# Patient Record
Sex: MALE | Race: BLACK OR AFRICAN AMERICAN | NOT HISPANIC OR LATINO | Employment: FULL TIME | URBAN - METROPOLITAN AREA
[De-identification: names, ages, dates, MRNs, and addresses within clinical notes are randomized per-mention and may not be internally consistent; named-entity substitution may affect disease eponyms.]

---

## 2018-11-19 ENCOUNTER — OFFICE VISIT (OUTPATIENT)
Dept: INTERNAL MEDICINE | Facility: CLINIC | Age: 33
End: 2018-11-19
Payer: COMMERCIAL

## 2018-11-19 ENCOUNTER — LAB VISIT (OUTPATIENT)
Dept: LAB | Facility: HOSPITAL | Age: 33
End: 2018-11-19
Attending: FAMILY MEDICINE
Payer: COMMERCIAL

## 2018-11-19 VITALS
SYSTOLIC BLOOD PRESSURE: 146 MMHG | OXYGEN SATURATION: 98 % | WEIGHT: 277.31 LBS | HEIGHT: 71 IN | HEART RATE: 78 BPM | DIASTOLIC BLOOD PRESSURE: 104 MMHG | TEMPERATURE: 99 F | BODY MASS INDEX: 38.82 KG/M2

## 2018-11-19 DIAGNOSIS — Z11.4 SCREENING FOR HIV WITHOUT PRESENCE OF RISK FACTORS: ICD-10-CM

## 2018-11-19 DIAGNOSIS — Z11.3 ROUTINE SCREENING FOR STI (SEXUALLY TRANSMITTED INFECTION): ICD-10-CM
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
## 2018-11-19 DIAGNOSIS — M54.2 NECK PAIN, ACUTE: ICD-10-CM

## 2018-11-19 DIAGNOSIS — R74.01 ELEVATED TRANSAMINASE LEVEL: Chronic | ICD-10-CM

## 2018-11-19 DIAGNOSIS — Z11.59 ENCOUNTER FOR HEPATITIS C SCREENING TEST FOR LOW RISK PATIENT: ICD-10-CM

## 2018-11-19 DIAGNOSIS — S13.9XXA ACUTE NECK SPRAIN, INITIAL ENCOUNTER: ICD-10-CM

## 2018-11-19 DIAGNOSIS — E66.01 SEVERE OBESITY (BMI 35.0-39.9) WITH COMORBIDITY: Chronic | ICD-10-CM

## 2018-11-19 DIAGNOSIS — S23.9XXA THORACIC BACK SPRAIN, INITIAL ENCOUNTER: ICD-10-CM

## 2018-11-19 DIAGNOSIS — M54.6 ACUTE BILATERAL THORACIC BACK PAIN: ICD-10-CM

## 2018-11-19 DIAGNOSIS — I10 BENIGN ESSENTIAL HYPERTENSION: Primary | Chronic | ICD-10-CM

## 2018-11-19 PROCEDURE — 36415 COLL VENOUS BLD VENIPUNCTURE: CPT | Mod: PO

## 2018-11-19 PROCEDURE — 99999 PR PBB SHADOW E&M-NEW PATIENT-LVL IV: CPT | Mod: PBBFAC,,, | Performed by: FAMILY MEDICINE

## 2018-11-19 PROCEDURE — 86703 HIV-1/HIV-2 1 RESULT ANTBDY: CPT

## 2018-11-19 PROCEDURE — 86592 SYPHILIS TEST NON-TREP QUAL: CPT

## 2018-11-19 PROCEDURE — 86803 HEPATITIS C AB TEST: CPT

## 2018-11-19 PROCEDURE — 99203 OFFICE O/P NEW LOW 30 MIN: CPT | Mod: S$GLB,,, | Performed by: FAMILY MEDICINE

## 2018-11-19 RX ORDER — NAPROXEN 500 MG/1
500 TABLET ORAL 2 TIMES DAILY PRN
Qty: 30 TABLET | Refills: 1 | Status: SHIPPED | OUTPATIENT
Start: 2018-11-19 | End: 2019-08-26

## 2018-11-19 RX ORDER — CHLORTHALIDONE 25 MG/1
25 TABLET ORAL DAILY
Qty: 30 TABLET | Refills: 0 | Status: SHIPPED | OUTPATIENT
Start: 2018-11-19 | End: 2018-12-10 | Stop reason: SDUPTHER

## 2018-11-19 RX ORDER — PHENTERMINE HYDROCHLORIDE 37.5 MG/1
37.5 TABLET ORAL
COMMUNITY
End: 2019-08-26
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
## 2018-11-20 LAB
HCV AB SERPL QL IA: NEGATIVE
HIV 1+2 AB+HIV1 P24 AG SERPL QL IA: NEGATIVE
RPR SER QL: NORMAL

## 2018-12-10 ENCOUNTER — LAB VISIT (OUTPATIENT)
Dept: LAB | Facility: HOSPITAL | Age: 33
End: 2018-12-10
Attending: FAMILY MEDICINE
Payer: COMMERCIAL

## 2018-12-10 ENCOUNTER — CLINICAL SUPPORT (OUTPATIENT)
Dept: CARDIOLOGY | Facility: CLINIC | Age: 33
End: 2018-12-10
Payer: COMMERCIAL

## 2018-12-10 ENCOUNTER — PATIENT MESSAGE (OUTPATIENT)
Dept: ADMINISTRATIVE | Facility: OTHER | Age: 33
End: 2018-12-10

## 2018-12-10 ENCOUNTER — OFFICE VISIT (OUTPATIENT)
Dept: INTERNAL MEDICINE | Facility: CLINIC | Age: 33
End: 2018-12-10
Payer: COMMERCIAL

## 2018-12-10 VITALS
TEMPERATURE: 98 F | SYSTOLIC BLOOD PRESSURE: 138 MMHG | WEIGHT: 278.44 LBS | HEIGHT: 71 IN | HEART RATE: 99 BPM | DIASTOLIC BLOOD PRESSURE: 100 MMHG | OXYGEN SATURATION: 99 % | BODY MASS INDEX: 38.98 KG/M2

## 2018-12-10 DIAGNOSIS — M54.6 ACUTE BILATERAL THORACIC BACK PAIN: ICD-10-CM

## 2018-12-10 DIAGNOSIS — M54.2 NECK PAIN, ACUTE: ICD-10-CM

## 2018-12-10 DIAGNOSIS — R74.01 ELEVATED TRANSAMINASE LEVEL: Chronic | ICD-10-CM

## 2018-12-10 DIAGNOSIS — S23.9XXA THORACIC BACK SPRAIN, INITIAL ENCOUNTER: ICD-10-CM

## 2018-12-10 DIAGNOSIS — I10 BENIGN ESSENTIAL HYPERTENSION: Chronic | ICD-10-CM
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
## 2018-12-10 DIAGNOSIS — S13.9XXA ACUTE NECK SPRAIN, INITIAL ENCOUNTER: ICD-10-CM

## 2018-12-10 DIAGNOSIS — E78.5 DYSLIPIDEMIA (HIGH LDL; LOW HDL): Chronic | ICD-10-CM

## 2018-12-10 LAB
ANION GAP SERPL CALC-SCNC: 10 MMOL/L
BUN SERPL-MCNC: 14 MG/DL
CALCIUM SERPL-MCNC: 10.1 MG/DL
CHLORIDE SERPL-SCNC: 99 MMOL/L
CO2 SERPL-SCNC: 29 MMOL/L
CREAT SERPL-MCNC: 1.2 MG/DL
EST. GFR  (AFRICAN AMERICAN): >60 ML/MIN/1.73 M^2
EST. GFR  (NON AFRICAN AMERICAN): >60 ML/MIN/1.73 M^2
GLUCOSE SERPL-MCNC: 89 MG/DL
POTASSIUM SERPL-SCNC: 4 MMOL/L
SODIUM SERPL-SCNC: 138 MMOL/L

## 2018-12-10 PROCEDURE — 99999 PR PBB SHADOW E&M-EST. PATIENT-LVL III: CPT | Mod: PBBFAC,,, | Performed by: FAMILY MEDICINE

## 2018-12-10 PROCEDURE — 99214 OFFICE O/P EST MOD 30 MIN: CPT | Mod: S$GLB,,, | Performed by: FAMILY MEDICINE

## 2018-12-10 PROCEDURE — 80048 BASIC METABOLIC PNL TOTAL CA: CPT

## 2018-12-10 PROCEDURE — 36415 COLL VENOUS BLD VENIPUNCTURE: CPT | Mod: PO

## 2018-12-10 PROCEDURE — 93000 ELECTROCARDIOGRAM COMPLETE: CPT | Mod: S$GLB,,, | Performed by: NUCLEAR MEDICINE

## 2018-12-10 RX ORDER — CHLORTHALIDONE 25 MG/1
25 TABLET ORAL DAILY
Qty: 30 TABLET | Refills: 2 | Status: SHIPPED | OUTPATIENT
Start: 2018-12-10 | End: 2019-08-26 | Stop reason: SDUPTHER

## 2018-12-10 RX ORDER — AMLODIPINE BESYLATE 5 MG/1
5 TABLET ORAL DAILY
Qty: 30 TABLET | Refills: 2 | Status: SHIPPED | OUTPATIENT
Start: 2018-12-10 | End: 2019-08-26 | Stop reason: SDUPTHER

## 2018-12-10 NOTE — PATIENT INSTRUCTIONS
Learn about a heart-healthy lifestyle at the website of the [employer], www.heart.org.    Aliskiren; Amlodipine oral tablets  What is this medicine?  ALISKIREN; AMLODIPINE (a lis ESTRADA cruz; am JENNIFER tate) is a combination of a renin inhibitor and a calcium channel blocker. This medicine is used to treat high blood pressure.  How should I use this medicine?  Take this medicine by mouth with a glass of water. Follow the directions on your prescription label. You may take this medicine with or without food, but try to take it the same way every time. Take your medicine at regular intervals. Do not take it more often than directed. Do not stop taking except on your doctor's advice.  Talk to your pediatrician regarding the use of this medicine in children. Special care may be needed.  What side effects may I notice from receiving this medicine?  Side effects that you should report to your doctor or health care professional as soon as possible:  · allergic reactions like skin rash or hives, swelling of the hands, feet, face, lips, throat, or tongue  · breathing problems  · chest pain  · fast, irregular heartbeat  · feeling faint or lightheaded, falls  · low blood pressure  · seizures  · swelling of ankles, feet, hands  Side effects that usually do not require medical attention (Report these to your doctor or health care professional if they continue or are bothersome.):  · cough  · diarrhea  · dry mouth  · facial flushing  · nausea, vomiting  · upset stomach  · weak or tired  What may interact with this medicine?  · atorvastatin  · certain medicines for fungal infections like ketoconazole and itraconazole  · cyclosporine  · diuretics  · medicines for blood pressure  · potassium supplements  · salt substitutes with potassium  What if I miss a dose?  If you miss a dose, take it as soon as you can. If it is almost time for your next dose, take only that dose. Do not take double or extra doses.  Where should I  keep my medicine?  Keep out of the reach of children.  Store at room temperature between 15 and 30 degrees C (59 and 86 degrees F). Protect from heat and moisture. Throw away any unused medicine after the expiration date.  What should I tell my health care provider before I take this medicine?  They need to know if you have any of these conditions:  · dehydration  · diabetes  · heart problems like heart failure or aortic stenosis  · kidney disease  · liver disease  · an unusual or allergic reaction to aliskiren, amlodipine, other medicines, foods, dyes, or preservatives  · pregnant or trying to get pregnant  · breast-feeding  What should I watch for while using this medicine?  Visit your doctor or health care professional for regular checks on your progress. Check your blood pressure as directed. Ask your doctor or health care professional what your blood pressure should be and when you should contact him or her.  If you have diabetes and are taking a medicine called an angiotensin-receptor-blocker (ARB) or angiotensin-converting-enzyme-inhibitor (ACE inhibitor), do not take this medicine. Talk to your doctor or health care professional for more information.  Women should inform their doctor if they wish to become pregnant or think they might be pregnant. There is a potential for serious side effects to an unborn child. Talk to your health care professional or pharmacist for more information.  This medicine may make you feel confused, dizzy or lightheaded. Do not drive, use machinery, or do anything that needs mental alertness until you know how this medicine affects you. To reduce the risk of dizzy or fainting spells, do not sit or stand up quickly, especially if you are an older patient. Avoid alcoholic drinks; they can make you more dizzy.  NOTE:This sheet is a summary. It may not cover all possible information. If you have questions about this medicine, talk to your doctor, pharmacist, or health care provider.  Copyright© 2017 [employer]
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
## 2018-12-10 NOTE — PROGRESS NOTES
"Hi, .    I'm happy to report that your electrocardiogram (EKG) is NORMAL.    If you have any questions about your test results, write them down and bring them with you to your next appointment. You can call or message me in the meantime if you have urgent questions.    Thank you for letting me care for you. I look forward to seeing you at your next appointment.    Sincerely,    ANAMIKA Barrera MD    P.S. - Want to learn more about your test results and what they mean? It's as simple as 1, 2, 3.     (1) Log in to your [facility] account at [email or web address]     (2) From the "View test results" tab, click on the test you want to know more about.     (3) Click on the "About This Test" link."

## 2018-12-12 NOTE — PROGRESS NOTES
"Hi, .    The basic metabolic panel (BMP) checks kidney function, sodium, potassium, glucose (sugar) and other aspects of your metabolism. Your BMP is NORMAL.    If you have any questions about your test results, write them down and bring them with you to your next appointment. You can call or message me in the meantime if you have urgent questions.    Thank you for letting me care for you. I look forward to seeing you at your next appointment.    Sincerely,    ANAMIKA Barrera MD    P.S. - Want to learn more about your test results and what they mean? It's as simple as 1, 2, 3.     (1) Log in to your [facility] account at [email or web address]     (2) From the "View test results" tab, click on the test you want to know more about.     (3) Click on the "About This Test" link."

## 2018-12-27 ENCOUNTER — PATIENT OUTREACH (OUTPATIENT)
Dept: OTHER | Facility: OTHER | Age: 33
End: 2018-12-27

## 2018-12-27 NOTE — LETTER
Haylie Goemz PA-C  [address] [place] [address]     Dear Marquis Zazueta,    Welcome to the [facility]!           My name is Haylie Gomez PA-C and I am your dedicated Digital Medicine clinician.  As an expert in medication management, I will help ensure that the medications you are taking continue to provide you with the intended benefits.        I am Tiarra Escamilla and I will be your health  for the duration of the program.  My  job is to help you identify lifestyle changes to improve your blood pressure control.  We will talk about nutrition, exercise, and other ways that you may be able to adjust your current habits to better your health. Together, we will work to improve your overall health and encourage you to meet your goals for a healthier lifestyle.    What we expect from YOU:    You will need to take blood pressure readings multiple times a week and no less than one reading per week.   It is important that you take your measurements at different times during the day, when possible.     What you should expect from your Digital Medicine Care Team:   We will provide you with education about high blood pressure, including lifestyle changes that could help you to control your blood pressure.   We will review your weekly readings and provide you with monthly blood pressure progress reports after you have been in the program for more than 30 days.   We will send monthly progress reports on your blood pressure control to your physician so they can follow along with your progress as well.    You will be able to reach me by phone at [phone] or through your [facility] account by clicking my name under Care Team on the right side of the home screen.    I look forward to working with you to achieve your blood pressure goals!    Sincerely,  Haylie Gomez PA-C  Your personal clinician    Please visit  [email or web address] to learn more about high blood pressure and what you can do lower your blood pressure.                                                                                            [place]  [address] [place] [address]

## 2018-12-27 NOTE — PROGRESS NOTES
1st attempt for enrollment call. Left voicemail.         Last 5 Patient Entered Readings                                      Current 30 Day Average: 140/85     Recent Readings 12/24/2018 12/24/2018 12/24/2018 12/22/2018 12/22/2018    SBP (mmHg) 145 128 142 135 129    DBP (mmHg) 85 75 90 83 90    Pulse 89 88 87 83 85

## 2019-01-04 NOTE — PROGRESS NOTES
2nd attempt for enrollment call. Left voicemail. Sent My Chart message.            Last 5 Patient Entered Readings                                      Current 30 Day Average: 134/83     Recent Readings 1/1/2019 1/1/2019 12/30/2018 12/30/2018 12/29/2018    SBP (mmHg) 135 131 125 123 129    DBP (mmHg) 90 80 76 78 77    Pulse 74 74 87 88 87

## 2019-01-18 NOTE — PROGRESS NOTES
Digital Medicine Enrollment Call    Introduced Mr. Marquis Zazueta to Digital Medicine.     Discussed program expectations and requirements.    Introduced digital medicine care team.     Reviewed the importance of self-monitoring for digital medicine participation.     Reviewed that the Digital Medicine team is not available for emergencies and instructed the patient to call 911 or [facility] ([phone] or [phone]) if one arises.    Pt reports BP cuff may be too big for left arm. Plans to return to an [facility] to swap cuff.              Last 5 Patient Entered Readings                                      Current 30 Day Average: 137/81     Recent Readings 1/17/2019 1/17/2019 1/13/2019 1/13/2019 1/9/2019    SBP (mmHg) 117 146 152 132 154    DBP (mmHg) 79 79 70 82 72    Pulse 89 89 93 88 70

## 2019-01-24 ENCOUNTER — PATIENT OUTREACH (OUTPATIENT)
Dept: OTHER | Facility: OTHER | Age: 34
End: 2019-01-24

## 2019-01-24 NOTE — LETTER
April 29, 2019      [place]  [address]  [place] [address]       Dear ,    We have made several attempts to encourage your participation in [facility]s [facility] Program. Unfortunately, we have been unsuccessful.     This is an official notice that you are no longer enrolled in the digital medicine program, and thus, we will no longer be managing your disease states. Please note this has no impact on your relationship with [facility] or your providers. Going forward, please reach out to your primary care provider with any questions or concerns regarding your health.    Please contact [phone] if you have any additional questions.    Sincerely,  The [facility] Digital [facility] Team

## 2019-01-24 NOTE — LETTER
March 26, 2019      [place]  [address] [place] [address]       Dear ,    We have made several attempts to encourage your participation in [facility]. Unfortunately, we have been unsuccessful.     This is an official notice that you are no longer enrolled in the digital medicine program, and thus, we will no longer be managing your disease states. Please note this has no impact on your relationship with [facility] or your providers. Going forward, please reach out to your primary care provider with any questions or concerns regarding your health.    Please contact [phone] if you have any additional questions.    Sincerely,  The [facility] Team

## 2019-01-24 NOTE — LETTER
March 4, 2019      [place]  [address] [place] [address]       Dear ,    Thank you for enrolling in [facility] Program. To participate, we ask that you submit information at least once weekly through your [facility] account and maintain regular contact with your Care Team. We have not received any data or heard from you in some time.     The Digital Medicine Care Team has attempted to reach you on multiple occasions to determine if you would like to continue participating in the program. While we encourage you to continue participating fully, we understand that circumstances may change.     To continue participating in the program, please contact me at [phone]. If we do not hear back, you will be un-enrolled, and your physician will be notified of your decision.    If you have submitted data and believe you are receiving this letter in error, please call the Digital Medicine Patient Support Line at [phone] for troubleshooting.      We look forward to hearing from you soon.    Sincerely,     Tiarra Escamilla  Your Personal Health

## 2019-01-24 NOTE — PROGRESS NOTES
Last 5 Patient Entered Readings                                      Current 30 Day Average: 135/80     Recent Readings 1/23/2019 1/23/2019 1/17/2019 1/17/2019 1/13/2019    SBP (mmHg) 130 129 117 146 152    DBP (mmHg) 82 81 79 79 70    Pulse 79 77 89 89 93          Digital Medicine: Health  Introduction Call     Left voicemail and requested call back in order to complete Digital Medicine health  introduction call.

## 2019-01-28 NOTE — PROGRESS NOTES
Last 5 Patient Entered Readings                                      Current 30 Day Average: 135/80     Recent Readings 1/23/2019 1/23/2019 1/17/2019 1/17/2019 1/13/2019    SBP (mmHg) 130 129 117 146 152    DBP (mmHg) 82 81 79 79 70    Pulse 79 77 89 89 93          Digital Medicine: Health  Introduction Call     Left voicemail and requested call back in order to complete Digital Medicine health  introduction call.   Sent message via [employer]

## 2019-02-04 NOTE — PROGRESS NOTES
Last 5 Patient Entered Readings                                      Current 30 Day Average: 136/80     Recent Readings 2/2/2019 2/2/2019 1/23/2019 1/23/2019 1/17/2019    SBP (mmHg) 129 151 130 129 117    DBP (mmHg) 75 92 82 81 79    Pulse 79 88 79 77 89          Digital Medicine: Health  Introduction Call     Left voicemail and requested call back in order to complete Digital Medicine health  introduction call.

## 2019-02-06 ENCOUNTER — PATIENT OUTREACH (OUTPATIENT)
Dept: OTHER | Facility: OTHER | Age: 34
End: 2019-02-06

## 2019-02-06 DIAGNOSIS — I10 BENIGN ESSENTIAL HYPERTENSION: Primary | Chronic | ICD-10-CM

## 2019-02-06 NOTE — PROGRESS NOTES
Last 5 Patient Entered Readings                                      Current 30 Day Average: 136/80     Recent Readings 2/2/2019 2/2/2019 1/23/2019 1/23/2019 1/17/2019    SBP (mmHg) 129 151 130 129 117    DBP (mmHg) 75 92 82 81 79    Pulse 79 88 79 77 89          2/13: Left message.  Sending MyChart message.  2/27: Left another message.  Did not read MyChart message.  If not reached by HC on 3/4, send Non-compliance letter.     3/13: HC sent NC letter on 3/4. Closing this encounter in anticipation of discharge from program.

## 2019-02-18 NOTE — PROGRESS NOTES
Last 5 Patient Entered Readings                                      Current 30 Day Average: 130/83     Recent Readings 2/2/2019 2/2/2019 1/23/2019 1/23/2019 1/17/2019    SBP (mmHg) 129 151 130 129 117    DBP (mmHg) 75 92 82 81 79    Pulse 79 88 79 77 89          Digital Medicine: Health  Introduction Call     Left voicemail and requested call back in order to complete Digital Medicine health  introduction call.

## 2019-03-04 NOTE — PROGRESS NOTES
Last 5 Patient Entered Readings                                      Current 30 Day Average: 129/84     Recent Readings 2/2/2019 2/2/2019 1/23/2019 1/23/2019 1/17/2019    SBP (mmHg) 129 151 130 129 117    DBP (mmHg) 75 92 82 81 79    Pulse 79 88 79 77 89          Digital Medicine: Health  Introduction Call     Left voicemail and requested call back in order to complete Digital Medicine health  introduction call.   Sending Non-compliance letter. Will wait for response. If no response in 2 weeks patient will be removed from program.

## 2019-03-18 NOTE — PROGRESS NOTES
Last 5 Patient Entered Readings                                      Current 30 Day Average:      Recent Readings 2/2/2019 2/2/2019 1/23/2019 1/23/2019 1/17/2019    SBP (mmHg) 129 151 130 129 117    DBP (mmHg) 75 92 82 81 79    Pulse 79 88 79 77 89          Sending message to enrolling physician to encourage more participation.

## 2019-03-26 NOTE — PROGRESS NOTES
Last 5 Patient Entered Readings                                      Current 30 Day Average:      Recent Readings 2/2/2019 2/2/2019 1/23/2019 1/23/2019 1/17/2019    SBP (mmHg) 129 151 130 129 117    DBP (mmHg) 75 92 82 81 79    Pulse 79 88 79 77 89          No response from enrolling physician or patient. Sending discharge letter via mail.

## 2019-04-29 NOTE — PROGRESS NOTES
Last 5 Patient Entered Readings                                      Current 30 Day Average: 142/101     Recent Readings 4/8/2019 4/8/2019 2/2/2019 2/2/2019 1/23/2019    SBP (mmHg) 142 130 129 151 130    DBP (mmHg) 100 101 75 92 82    Pulse 90 94 79 88 79          Certified discharge letter attempted delivery.   Sending letter via [email or web address]. Will f/u in 1 week.

## 2019-05-13 NOTE — PROGRESS NOTES
Last 5 Patient Entered Readings                                      Current 30 Day Average:      Recent Readings 4/8/2019 4/8/2019 2/2/2019 2/2/2019 1/23/2019    SBP (mmHg) 142 130 129 151 130    DBP (mmHg) 100 101 75 92 82    Pulse 90 94 79 88 79           Discharging patient from digital medicine program due to lack of communication.

## 2019-06-13 ENCOUNTER — PATIENT OUTREACH (OUTPATIENT)
Dept: ADMINISTRATIVE | Facility: HOSPITAL | Age: 34
End: 2019-06-13

## 2019-08-20 ENCOUNTER — TELEPHONE (OUTPATIENT)
Dept: ADMINISTRATIVE | Facility: HOSPITAL | Age: 34
End: 2019-08-20

## 2019-08-20 NOTE — TELEPHONE ENCOUNTER
Contacted patient to schedule follow up with PCP. Left voicemail message for patient to call back and schedule visit to follow up on their hypertension. [place]

## 2019-08-26 ENCOUNTER — OFFICE VISIT (OUTPATIENT)
Dept: INTERNAL MEDICINE | Facility: CLINIC | Age: 34
End: 2019-08-26
Payer: COMMERCIAL

## 2019-08-26 VITALS
HEIGHT: 71 IN | TEMPERATURE: 99 F | DIASTOLIC BLOOD PRESSURE: 88 MMHG | WEIGHT: 270.31 LBS | SYSTOLIC BLOOD PRESSURE: 128 MMHG | BODY MASS INDEX: 37.84 KG/M2 | OXYGEN SATURATION: 99 % | HEART RATE: 84 BPM

## 2019-08-26 DIAGNOSIS — Z00.00 PREVENTATIVE HEALTH CARE: Primary | ICD-10-CM

## 2019-08-26 DIAGNOSIS — I10 BENIGN ESSENTIAL HYPERTENSION: Chronic | ICD-10-CM

## 2019-08-26 DIAGNOSIS — Z79.899 ENCOUNTER FOR LONG-TERM CURRENT USE OF MEDICATION: ICD-10-CM

## 2019-08-26 DIAGNOSIS — E78.5 DYSLIPIDEMIA (HIGH LDL; LOW HDL): Chronic | ICD-10-CM

## 2019-08-26 DIAGNOSIS — G89.29 CHRONIC PAIN OF RIGHT KNEE: Chronic | ICD-10-CM

## 2019-08-26 DIAGNOSIS — E66.01 SEVERE OBESITY (BMI 35.0-39.9) WITH COMORBIDITY: Chronic | ICD-10-CM

## 2019-08-26 DIAGNOSIS — M25.561 CHRONIC PAIN OF RIGHT KNEE: Chronic | ICD-10-CM

## 2019-08-26 DIAGNOSIS — Z30.09 VASECTOMY EVALUATION: ICD-10-CM

## 2019-08-26 PROBLEM — M54.6 ACUTE THORACIC BACK PAIN: Status: RESOLVED | Noted: 2018-11-19 | Resolved: 2019-08-26
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
## 2019-08-26 PROBLEM — S23.9XXA THORACIC BACK SPRAIN: Status: RESOLVED | Noted: 2018-11-19 | Resolved: 2019-08-26

## 2019-08-26 PROBLEM — M54.2 NECK PAIN, ACUTE: Status: RESOLVED | Noted: 2018-11-19 | Resolved: 2019-08-26

## 2019-08-26 PROBLEM — S13.9XXA ACUTE NECK SPRAIN, INITIAL ENCOUNTER: Status: RESOLVED | Noted: 2018-11-19 | Resolved: 2019-08-26

## 2019-08-26 PROCEDURE — 99999 PR PBB SHADOW E&M-EST. PATIENT-LVL III: CPT | Mod: PBBFAC,,, | Performed by: FAMILY MEDICINE

## 2019-08-26 PROCEDURE — 99395 PREV VISIT EST AGE 18-39: CPT | Mod: S$GLB,,, | Performed by: FAMILY MEDICINE

## 2019-08-26 PROCEDURE — 99395 PR PREVENTIVE VISIT,EST,18-39: ICD-10-PCS | Mod: S$GLB,,, | Performed by: FAMILY MEDICINE

## 2019-08-26 PROCEDURE — 99999 PR PBB SHADOW E&M-EST. PATIENT-LVL III: ICD-10-PCS | Mod: PBBFAC,,, | Performed by: FAMILY MEDICINE

## 2019-08-26 RX ORDER — NAPROXEN 500 MG/1
500 TABLET ORAL 2 TIMES DAILY PRN
Qty: 60 TABLET | Refills: 1 | Status: SHIPPED | OUTPATIENT
Start: 2019-08-26

## 2019-08-26 RX ORDER — CHLORTHALIDONE 25 MG/1
25 TABLET ORAL DAILY
Qty: 90 TABLET | Refills: 3 | Status: SHIPPED | OUTPATIENT
Start: 2019-08-26 | End: 2021-01-03

## 2019-08-26 RX ORDER — AMLODIPINE BESYLATE 5 MG/1
5 TABLET ORAL DAILY
Qty: 90 TABLET | Refills: 3 | Status: SHIPPED | OUTPATIENT
Start: 2019-08-26 | End: 2021-01-03

## 2019-08-26 NOTE — PATIENT INSTRUCTIONS
Obesity is a chronic condition that affects more than one in three adults in the [place]. Another one in three adults is overweight. If you are struggling with your weight, you may find a healthy eating plan and regular physical activity help you lose weight and keep it off over the long term. If these lifestyle changes are not enough to help you lose weight or maintain your weight loss, you may benefit from a prescription medication as part of your weight-control program.    If you are interested having a prescription medication as part of your weight-control program, please learn about FDA approved medications to treat obesity at:  [email or web address]    Note that some of the medicines, such as phentermine (brand name = Adipex-P), are not FDA approved for use for long term weight loss.    You may also want to check with your health insurance to see if they cover these medicines, because many insurances will not pay for them, and they can cost as much as $300 a month.    After you learn about the medicines available to treat obesity, if you are interested in trying one, schedule an appointment with me at your earliest convenience. Write down any questions you have and bring them with you to your appointment so we can so we can discuss your treatment options face-to-face and decide on the treatment that is best for you.      Bariatric surgery can help people who suffer from obesity lose weight, treat obesity-related diseases like diabetes and high blood pressure, and improve their quality of life.    [facility] has a very good comprehensive weight loss program. To learn more:    COMPREHENSIVE WEIGHT LOSS PROGRAM - Phone [phone]     [email or web address]    You should be able to schedule your initial appointment without a formal referral from me. If you are told that you need a referral from your primary care physician, just let me know, and I'll be happy to  provide this.    Please keep me informed of your plans.    Thanks for letting me care for you.    Sincerely,    ANAMIKA Barrera MD

## 2019-08-26 NOTE — PROGRESS NOTES
CHIEF COMPLAINT  Annual Exam        DIAGNOSES.   1. Preventative health care    2. Severe obesity (BMI 35.0-39.9) with comorbidity    3. Dyslipidemia (high LDL; low HDL)    4. Benign essential hypertension    5. Chronic pain of right knee    6. Encounter for long-term current use of medication    7. Vasectomy evaluation      HEALTH MAINTENANCE INTERVENTIONS - UP TO DATE  Health Maintenance Topics with due status: Not Due       Topic Last Completion Date    TETANUS VACCINE 08/26/2016    Influenza Vaccine Not Due       HEALTH MAINTENANCE INTERVENTIONS - DUE OR DUE SOON  There are no preventive care reminders to display for this patient.    He requests referral to urology for vasectomy.    ORDER SUMMARY  Orders Placed This Encounter    Basic metabolic panel    CBC auto differential    Lipid panel    Ambulatory Referral to Urology    chlorthalidone (HYGROTEN) 25 MG Tab    amLODIPine (NORVASC) 5 MG tablet    naproxen (NAPROSYN) 500 MG tablet       Problem List Items Addressed This Visit        Cardiac/Vascular    Benign essential hypertension (Chronic)    Current Assessment & Plan     Well controlled.         Relevant Medications    chlorthalidone (HYGROTEN) 25 MG Tab    amLODIPine (NORVASC) 5 MG tablet    Other Relevant Orders    Basic metabolic panel    Dyslipidemia (high LDL; low HDL) (Chronic)    Relevant Orders    Lipid panel       Endocrine    Severe obesity (BMI 35.0-39.9) with comorbidity (Chronic)    Current Assessment & Plan     Therapeutic lifestyle changes encouraged.             Orthopedic    Chronic pain of right knee (Chronic)    Overview     After patellofemoral tendon rupture repair.         Current Assessment & Plan     Uses naproxen 2-3 tablets per week on average.         Relevant Medications    naproxen (NAPROSYN) 500 MG tablet      Other Visit Diagnoses     Preventative health care    -  Primary    Relevant Orders    Basic metabolic panel    CBC auto differential    Lipid panel    Encounter  for long-term current use of medication        Relevant Orders    Basic metabolic panel    CBC auto differential    Vasectomy evaluation        Relevant Orders    Ambulatory Referral to Urology          Outpatient Medications Prior to Visit   Medication Sig Dispense Refill    amLODIPine (NORVASC) 5 MG tablet Take 1 tablet (5 mg total) by mouth once daily. 30 tablet 2    chlorthalidone (HYGROTEN) 25 MG Tab Take 1 tablet (25 mg total) by mouth once daily. 30 tablet 2    naproxen (NAPROSYN) 500 MG tablet Take 1 tablet (500 mg total) by mouth 2 (two) times daily as needed (pain). 30 tablet 1    phentermine (ADIPEX-P) 37.5 mg tablet Take 37.5 mg by mouth before breakfast.       No facility-administered medications prior to visit.         Medications Ordered This Encounter   Medications    amLODIPine (NORVASC) 5 MG tablet     Sig: Take 1 tablet (5 mg total) by mouth once daily.     Dispense:  90 tablet     Refill:  3    chlorthalidone (HYGROTEN) 25 MG Tab     Sig: Take 1 tablet (25 mg total) by mouth once daily.     Dispense:  90 tablet     Refill:  3    naproxen (NAPROSYN) 500 MG tablet     Sig: Take 1 tablet (500 mg total) by mouth 2 (two) times daily as needed (pain).     Dispense:  60 tablet     Refill:  1       Medications Discontinued During This Encounter   Medication Reason    phentermine (ADIPEX-P) 37.5 mg tablet Patient no longer taking    naproxen (NAPROSYN) 500 MG tablet Patient no longer taking    chlorthalidone (HYGROTEN) 25 MG Tab Reorder    amLODIPine (NORVASC) 5 MG tablet Reorder        Follow up in about 1 year (around 8/26/2020) for wellness and preventive services.      REVIEW OF SYSTEMS  CONSTITUTIONAL: No fever reported.  CARDIOVASCULAR: No angina reported.  PULMONARY: No hemoptysis reported.  PSYCHIATRIC: No mood instability reported.  NEUROLOGIC: No seizures reported.  ENDOCRINE: No polydipsia reported.  GASTROINTESTINAL: No blood in stool reported.  GENITOURINARY: No hematuria  "reported.  ENT: No acute hearing changes reported.  OPHTHALMOLOGIC: No acute vision changes reported.  HEMATOLOGIC: No bleeding problems reported.  MUSCULOSKELETAL: No recent injuries reported.  DERMATOLOGIC: No rash reported.  REMAINDER OF COMPLETE REVIEW OF SYSTEMS is negative or noncontributory to present illness except as noted herein.     PHYSICAL EXAM  Vitals:    08/26/19 1643   BP: 128/88   BP Location: Right arm   Patient Position: Sitting   Pulse: 84   Temp: 98.5 °F (36.9 °C)   TempSrc: Oral   SpO2: 99%   Weight: 122.6 kg (270 lb 4.5 oz)   Height: 5' 10.5" (1.791 m)     CONSTITUTIONAL: Vital signs noted. No apparent distress. Does not appear acutely ill or septic. Appears adequately hydrated.  ENT: Oropharynx moist.  PULM: Breathing unlabored.  CV: Heart regular.  DERM: Skin normothermic.  PSYCHIATRIC: Alert and conversant. Grossly oriented. Mood is grossly neutral. Affect appropriate. Judgment and insight not grossly compromised.     PAST MEDICAL HISTORY  He has a past medical history of Benign essential hypertension, Dyslipidemia (high LDL; low HDL), Elevated transaminase level, Obesity, and Severe obesity (BMI 35.0-39.9) with comorbidity.    SURGICAL HISTORY  He has a past surgical history that includes Rotator cuff repair and Patellar tendon repair (Right).    FAMILY HISTORY  His family history includes Diabetes in his maternal grandmother; Hypertension in his maternal grandmother.     ALLERGIES  Review of patient's allergies indicates:  No Known Allergies    SOCIAL HISTORY   TOBACCO USE HISTORY: He reports that he has never smoked. He has never used smokeless tobacco.   ALCOHOL USE HISTORY:  He reports that he does not drink alcohol.   RECREATIONAL DRUG USE HISTORY:  He reports that he does not use drugs.    ABOUT THIS DOCUMENTATION:  · Documentation entered by me for this encounter was done in part using speech-recognition technology. Although I have made an effort to ensure accuracy, malapropisms " may exist and should be interpreted in context.                        AYLA Barrera MD

## 2020-10-06 ENCOUNTER — PATIENT MESSAGE (OUTPATIENT)
Dept: ADMINISTRATIVE | Facility: HOSPITAL | Age: 35
End: 2020-10-06

## 2020-12-10 ENCOUNTER — TELEPHONE (OUTPATIENT)
Dept: INTERNAL MEDICINE | Facility: CLINIC | Age: 35
End: 2020-12-10

## 2020-12-10 NOTE — TELEPHONE ENCOUNTER
Spoke with patient and he stated that he wanted to schedule his annual exam and also wanted a Covid-19 test because he just came back from a trip. Assisted patient in scheduling a annual exam tomorrow with Cheyenne Padilla NP. He denies any Covid-19 symptoms and I informed him that we do not do Covid-19 tests at [facility] for nonsymptomatic patients and that he can go to one of the community testing sites. He verbalized understanding.

## 2020-12-10 NOTE — TELEPHONE ENCOUNTER
----- Message from Harriet Bermeo sent at 12/10/2020  2:44 PM CST -----  Type:  Needs Medical Advice    Who Called: Pt   Symptoms (please be specific):    How long has patient had these symptoms:    Pharmacy name and phone #:    Would the patient rather a call back or a response via [facility]? Call   Best Call Back Number: [phone] (home)   Additional Information:   Pt is requesting to be seen for a checkuo also wants an order for covid test, states he just came back from trip

## 2020-12-26 DIAGNOSIS — I10 BENIGN ESSENTIAL HYPERTENSION: Chronic | ICD-10-CM

## 2021-01-03 RX ORDER — AMLODIPINE BESYLATE 5 MG/1
TABLET ORAL
Qty: 30 TABLET | Refills: 0 | Status: SHIPPED | OUTPATIENT
Start: 2021-01-03 | End: 2021-02-13

## 2021-01-03 RX ORDER — CHLORTHALIDONE 25 MG/1
TABLET ORAL
Qty: 30 TABLET | Refills: 0 | Status: SHIPPED | OUTPATIENT
Start: 2021-01-03 | End: 2021-02-13

## 2021-02-08 ENCOUNTER — TELEPHONE (OUTPATIENT)
Dept: INTERNAL MEDICINE | Facility: CLINIC | Age: 36
End: 2021-02-08

## 2021-02-08 DIAGNOSIS — I10 BENIGN ESSENTIAL HYPERTENSION: Chronic | ICD-10-CM

## 2021-02-13 RX ORDER — AMLODIPINE BESYLATE 5 MG/1
5 TABLET ORAL DAILY
Qty: 30 TABLET | Refills: 0 | Status: SHIPPED | OUTPATIENT
Start: 2021-02-13 | End: 2021-12-17

## 2021-02-13 RX ORDER — CHLORTHALIDONE 25 MG/1
25 TABLET ORAL DAILY
Qty: 30 TABLET | Refills: 0 | Status: SHIPPED | OUTPATIENT
Start: 2021-02-13 | End: 2021-12-17

## 2021-02-19 ENCOUNTER — TELEPHONE (OUTPATIENT)
Dept: INTERNAL MEDICINE | Facility: CLINIC | Age: 36
End: 2021-02-19

## 2021-03-12 ENCOUNTER — TELEPHONE (OUTPATIENT)
Dept: INTERNAL MEDICINE | Facility: CLINIC | Age: 36
End: 2021-03-12

## 2021-03-14 ENCOUNTER — IMMUNIZATION (OUTPATIENT)
Dept: INTERNAL MEDICINE | Facility: CLINIC | Age: 36
End: 2021-03-14
Payer: MEDICAID

## 2021-03-14 DIAGNOSIS — Z23 NEED FOR VACCINATION: Primary | ICD-10-CM

## 2021-03-14 PROCEDURE — 0031A COVID-19,VECTOR-NR,RS-AD26,PF,0.5 ML DOSE VACCINE (JANSSEN): CPT | Mod: PBBFAC

## 2021-05-05 ENCOUNTER — TELEPHONE (OUTPATIENT)
Dept: INTERNAL MEDICINE | Facility: CLINIC | Age: 36
End: 2021-05-05

## 2021-05-06 ENCOUNTER — HOSPITAL ENCOUNTER (OUTPATIENT)
Dept: CARDIOLOGY | Facility: HOSPITAL | Age: 36
Discharge: HOME | End: 2021-05-06
Payer: MEDICAID

## 2021-05-06 ENCOUNTER — OFFICE VISIT (OUTPATIENT)
Dept: INTERNAL MEDICINE | Facility: CLINIC | Age: 36
End: 2021-05-06
Payer: MEDICAID

## 2021-05-06 VITALS
HEIGHT: 71 IN | DIASTOLIC BLOOD PRESSURE: 86 MMHG | TEMPERATURE: 98 F | WEIGHT: 283.06 LBS | BODY MASS INDEX: 39.63 KG/M2 | SYSTOLIC BLOOD PRESSURE: 122 MMHG

## 2021-05-06 DIAGNOSIS — L30.9 DERMATITIS: ICD-10-CM

## 2021-05-06 DIAGNOSIS — Z01.818 PREOP EXAMINATION: Primary | ICD-10-CM

## 2021-05-06 DIAGNOSIS — Z01.818 PREOP EXAMINATION: ICD-10-CM

## 2021-05-06 PROCEDURE — 99999 PR PBB SHADOW E&M-EST. PATIENT-LVL IV: ICD-10-PCS | Mod: PBBFAC,,, | Performed by: NURSE PRACTITIONER

## 2021-05-06 PROCEDURE — 99213 OFFICE O/P EST LOW 20 MIN: CPT | Mod: S$PBB,,, | Performed by: NURSE PRACTITIONER

## 2021-05-06 PROCEDURE — 99214 OFFICE O/P EST MOD 30 MIN: CPT | Mod: PBBFAC,25 | Performed by: NURSE PRACTITIONER

## 2021-05-06 PROCEDURE — 93005 ELECTROCARDIOGRAM TRACING: CPT

## 2021-05-06 PROCEDURE — 93010 EKG 12-LEAD: ICD-10-PCS | Mod: ,,, | Performed by: INTERNAL MEDICINE

## 2021-05-06 PROCEDURE — 93010 ELECTROCARDIOGRAM REPORT: CPT | Mod: ,,, | Performed by: INTERNAL MEDICINE

## 2021-05-06 PROCEDURE — 99213 PR OFFICE/OUTPT VISIT, EST, LEVL III, 20-29 MIN: ICD-10-PCS | Mod: S$PBB,,, | Performed by: NURSE PRACTITIONER

## 2021-05-06 PROCEDURE — 99999 PR PBB SHADOW E&M-EST. PATIENT-LVL IV: CPT | Mod: PBBFAC,,, | Performed by: NURSE PRACTITIONER

## 2021-05-06 RX ORDER — PHENTERMINE HYDROCHLORIDE 37.5 MG/1
37.5 TABLET ORAL EVERY MORNING
COMMUNITY
Start: 2021-03-12

## 2021-05-06 RX ORDER — ALPRAZOLAM 1 MG/1
1 TABLET ORAL 2 TIMES DAILY
COMMUNITY
Start: 2021-03-10 | End: 2022-08-08

## 2021-05-06 RX ORDER — MELOXICAM 15 MG/1
15 TABLET ORAL DAILY
COMMUNITY
Start: 2021-03-10

## 2021-05-13 ENCOUNTER — TELEPHONE (OUTPATIENT)
Dept: DERMATOLOGY | Facility: CLINIC | Age: 36
End: 2021-05-13

## 2021-05-17 ENCOUNTER — OFFICE VISIT (OUTPATIENT)
Dept: DERMATOLOGY | Facility: CLINIC | Age: 36
End: 2021-05-17
Payer: MEDICAID

## 2021-05-17 DIAGNOSIS — B36.0 TINEA VERSICOLOR: ICD-10-CM

## 2021-05-17 PROCEDURE — 99204 OFFICE O/P NEW MOD 45 MIN: CPT | Mod: S$PBB,,, | Performed by: STUDENT IN AN ORGANIZED HEALTH CARE EDUCATION/TRAINING PROGRAM

## 2021-05-17 PROCEDURE — 99212 OFFICE O/P EST SF 10 MIN: CPT | Mod: PBBFAC | Performed by: STUDENT IN AN ORGANIZED HEALTH CARE EDUCATION/TRAINING PROGRAM

## 2021-05-17 PROCEDURE — 99204 PR OFFICE/OUTPT VISIT, NEW, LEVL IV, 45-59 MIN: ICD-10-PCS | Mod: S$PBB,,, | Performed by: STUDENT IN AN ORGANIZED HEALTH CARE EDUCATION/TRAINING PROGRAM

## 2021-05-17 PROCEDURE — 99999 PR PBB SHADOW E&M-EST. PATIENT-LVL II: ICD-10-PCS | Mod: PBBFAC,,, | Performed by: STUDENT IN AN ORGANIZED HEALTH CARE EDUCATION/TRAINING PROGRAM

## 2021-05-17 PROCEDURE — 99999 PR PBB SHADOW E&M-EST. PATIENT-LVL II: CPT | Mod: PBBFAC,,, | Performed by: STUDENT IN AN ORGANIZED HEALTH CARE EDUCATION/TRAINING PROGRAM

## 2021-05-17 RX ORDER — KETOCONAZOLE 20 MG/ML
SHAMPOO, SUSPENSION TOPICAL
Qty: 120 ML | Refills: 5 | Status: SHIPPED | OUTPATIENT
Start: 2021-05-17

## 2021-05-17 RX ORDER — FLUCONAZOLE 200 MG/1
200 TABLET ORAL DAILY
Qty: 3 TABLET | Refills: 1 | Status: SHIPPED | OUTPATIENT
Start: 2021-05-17 | End: 2021-05-20

## 2021-08-05 ENCOUNTER — TELEPHONE (OUTPATIENT)
Dept: INTERNAL MEDICINE | Facility: CLINIC | Age: 36
End: 2021-08-05

## 2021-10-28 ENCOUNTER — IMMUNIZATION (OUTPATIENT)
Dept: PRIMARY CARE CLINIC | Facility: CLINIC | Age: 36
End: 2021-10-28
Payer: COMMERCIAL

## 2021-10-28 DIAGNOSIS — Z23 NEED FOR VACCINATION: Primary | ICD-10-CM

## 2021-10-28 PROCEDURE — 91301 COVID-19, MRNA, LNP-S, PF, 100 MCG/0.25 ML DOSE VACCINE (MODERNA BOOSTER): CPT | Mod: PBBFAC | Performed by: FAMILY MEDICINE

## 2022-01-17 DIAGNOSIS — I10 BENIGN ESSENTIAL HYPERTENSION: Chronic | ICD-10-CM

## 2022-01-17 NOTE — TELEPHONE ENCOUNTER
No new care gaps identified.  Powered by [employer]. Reference number: [id number].   1/17/2022 1:17:04 PM CST

## 2022-01-19 RX ORDER — AMLODIPINE BESYLATE 5 MG/1
TABLET ORAL
Qty: 28 TABLET | Refills: 0 | Status: SHIPPED | OUTPATIENT
Start: 2022-01-19 | End: 2022-08-08 | Stop reason: SDUPTHER

## 2022-01-19 RX ORDER — CHLORTHALIDONE 25 MG/1
TABLET ORAL
Qty: 28 TABLET | Refills: 0 | Status: SHIPPED | OUTPATIENT
Start: 2022-01-19 | End: 2022-08-08 | Stop reason: SDUPTHER

## 2022-01-20 NOTE — TELEPHONE ENCOUNTER
REFILL APPROVED. Will address further refills at upcoming appointment with me listed below.  Requested Prescriptions   Pending Prescriptions Disp Refills    chlorthalidone (HYGROTEN) 25 MG Tab [Pharmacy Med Name: Chlorthalidone 25 MG Oral Tablet] 28 tablet 0     Sig: TAKE 1 TABLET BY MOUTH ONCE DAILY NEED  APPOINTMENT  FOR  REFILLS    amLODIPine (NORVASC) 5 MG tablet [Pharmacy Med Name: amLODIPine Besylate 5 MG Oral Tablet] 28 tablet 0     Sig: TAKE 1 TABLET BY MOUTH ONCE DAILY NEED  APPOINTMENT  FOR  MORE  REFILLS    #LMRX   --------------------------------  Future Appointments   Date Time Provider Department Center   2/11/2022 10:00 AM ANAMIKA Barrera MD [facility]

## 2022-04-27 ENCOUNTER — PATIENT MESSAGE (OUTPATIENT)
Dept: ADMINISTRATIVE | Facility: HOSPITAL | Age: 37
End: 2022-04-27
Payer: COMMERCIAL

## 2022-07-27 ENCOUNTER — PATIENT MESSAGE (OUTPATIENT)
Dept: ADMINISTRATIVE | Facility: HOSPITAL | Age: 37
End: 2022-07-27
Payer: COMMERCIAL

## 2022-08-04 ENCOUNTER — TELEPHONE (OUTPATIENT)
Dept: INTERNAL MEDICINE | Facility: CLINIC | Age: 37
End: 2022-08-04
Payer: COMMERCIAL

## 2022-08-04 NOTE — TELEPHONE ENCOUNTER
----- Message from Stacie Jimenez sent at 8/3/2022  4:17 PM CDT -----  Contact:   Type:  Same Day Appointment Request    Caller is requesting a same day appointment.  Caller declined first available appointment listed below.    Name of Caller:   When is the first available appointment?8/8/22  Symptoms:High BP concern  Best Call Back Number:[phone]  Additional Information: Anyone who can see Pt will be fine.

## 2022-08-04 NOTE — TELEPHONE ENCOUNTER
called pt on 8/4/22. Spoke wiht pt and scheduled the next available appt at [facility] .     ML

## 2022-08-08 ENCOUNTER — OFFICE VISIT (OUTPATIENT)
Dept: INTERNAL MEDICINE | Facility: CLINIC | Age: 37
End: 2022-08-08
Payer: COMMERCIAL

## 2022-08-08 VITALS
TEMPERATURE: 98 F | HEART RATE: 98 BPM | OXYGEN SATURATION: 96 % | SYSTOLIC BLOOD PRESSURE: 138 MMHG | WEIGHT: 299.38 LBS | DIASTOLIC BLOOD PRESSURE: 74 MMHG | HEIGHT: 71 IN | BODY MASS INDEX: 41.91 KG/M2

## 2022-08-08 DIAGNOSIS — Z00.00 ANNUAL PHYSICAL EXAM: Primary | ICD-10-CM

## 2022-08-08 DIAGNOSIS — E78.5 DYSLIPIDEMIA (HIGH LDL; LOW HDL): ICD-10-CM

## 2022-08-08 DIAGNOSIS — I10 BENIGN ESSENTIAL HYPERTENSION: ICD-10-CM

## 2022-08-08 DIAGNOSIS — E66.01 MORBID OBESITY WITH BMI OF 40.0-44.9, ADULT: ICD-10-CM

## 2022-08-08 PROCEDURE — 3075F SYST BP GE 130 - 139MM HG: CPT | Mod: CPTII,S$GLB,,

## 2022-08-08 PROCEDURE — 3008F BODY MASS INDEX DOCD: CPT | Mod: CPTII,S$GLB,,

## 2022-08-08 PROCEDURE — 3008F PR BODY MASS INDEX (BMI) DOCUMENTED: ICD-10-PCS | Mod: CPTII,S$GLB,,

## 2022-08-08 PROCEDURE — 1160F RVW MEDS BY RX/DR IN RCRD: CPT | Mod: CPTII,S$GLB,,

## 2022-08-08 PROCEDURE — 1160F PR REVIEW ALL MEDS BY PRESCRIBER/CLIN PHARMACIST DOCUMENTED: ICD-10-PCS | Mod: CPTII,S$GLB,,

## 2022-08-08 PROCEDURE — 99395 PR PREVENTIVE VISIT,EST,18-39: ICD-10-PCS | Mod: S$GLB,,,

## 2022-08-08 PROCEDURE — 3075F PR MOST RECENT SYSTOLIC BLOOD PRESS GE 130-139MM HG: ICD-10-PCS | Mod: CPTII,S$GLB,,

## 2022-08-08 PROCEDURE — 99999 PR PBB SHADOW E&M-EST. PATIENT-LVL III: CPT | Mod: PBBFAC,,,

## 2022-08-08 PROCEDURE — 1159F MED LIST DOCD IN RCRD: CPT | Mod: CPTII,S$GLB,,

## 2022-08-08 PROCEDURE — 1159F PR MEDICATION LIST DOCUMENTED IN MEDICAL RECORD: ICD-10-PCS | Mod: CPTII,S$GLB,,

## 2022-08-08 PROCEDURE — 3078F PR MOST RECENT DIASTOLIC BLOOD PRESSURE < 80 MM HG: ICD-10-PCS | Mod: CPTII,S$GLB,,

## 2022-08-08 PROCEDURE — 3078F DIAST BP <80 MM HG: CPT | Mod: CPTII,S$GLB,,

## 2022-08-08 PROCEDURE — 99999 PR PBB SHADOW E&M-EST. PATIENT-LVL III: ICD-10-PCS | Mod: PBBFAC,,,

## 2022-08-08 PROCEDURE — 99395 PREV VISIT EST AGE 18-39: CPT | Mod: S$GLB,,,

## 2022-08-08 RX ORDER — CHLORTHALIDONE 25 MG/1
TABLET ORAL
Qty: 90 TABLET | Refills: 0 | Status: SHIPPED | OUTPATIENT
Start: 2022-08-08 | End: 2023-04-25

## 2022-08-08 RX ORDER — AMLODIPINE BESYLATE 5 MG/1
TABLET ORAL
Qty: 90 TABLET | Refills: 0 | Status: SHIPPED | OUTPATIENT
Start: 2022-08-08 | End: 2023-04-25

## 2022-08-08 NOTE — PROGRESS NOTES
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 and his BP was eleavated  He had not had his medication in a few weeks  He has increased water in take and working on his diet   Denies CP, blurry vision, or headahce     History:  Past Medical History:   Diagnosis Date    Benign essential hypertension 11/19/2018    Dyslipidemia (high LDL; low HDL) 12/10/2018    Elevated transaminase level 11/19/2018    Obesity     Severe obesity (BMI 35.0-39.9) with comorbidity 11/19/2018     Past Surgical History:   Procedure Laterality Date    PATELLAR TENDON REPAIR Right     ROTATOR CUFF REPAIR       Family History   Problem Relation Age of Onset    Diabetes Maternal Grandmother     Hypertension Maternal Grandmother      Social History     Socioeconomic History    Marital status: Single   Tobacco Use    Smoking status: Never Smoker    Smokeless tobacco: Never Used   Substance and Sexual Activity    Alcohol use: No     Comment: social    Drug use: No    Sexual activity: Yes     Partners: Female     Patient Active Problem List   Diagnosis    Severe obesity (BMI 35.0-39.9) with comorbidity    Benign essential hypertension    Dyslipidemia (high LDL; low HDL)    Chronic pain of right knee     Review of patient's allergies indicates:  No Known Allergies    The following were reviewed at this visit: active problem list, medication list, allergies, family history, social history, and health maintenance.    Medications:  Current Outpatient Medications on File Prior to Visit   Medication Sig Dispense Refill    amLODIPine (NORVASC) 5 MG tablet TAKE 1 TABLET BY MOUTH ONCE DAILY NEED   APPOINTMENT  FOR  MORE  REFILLS 28 tablet 0    chlorthalidone (HYGROTEN) 25 MG Tab TAKE 1 TABLET BY MOUTH ONCE DAILY NEED  APPOINTMENT  FOR  REFILLS 28 tablet 0    ketoconazole (NIZORAL) 2 % shampoo Apply topically twice a week. 120 mL 5    meloxicam (MOBIC) 15 MG tablet Take 15 mg by mouth once daily.      naproxen (NAPROSYN) 500 MG tablet Take 1 tablet (500 mg total) by mouth 2 (two) times daily as needed (pain). 60 tablet 1    phentermine (ADIPEX-P) 37.5 mg tablet Take 37.5 mg by mouth every morning.      ALPRAZolam (XANAX) 1 MG tablet Take 1 mg by mouth 2 (two) times daily.       No current facility-administered medications on file prior to visit.       Medications have been reviewed and reconciled with patient at this visit.  Barriers to medications reviewed with patient.    Adverse reactions to current medications reviewed with patient..    Over the counter medications reviewed and reconciled with patient.    Exam:  Wt Readings from Last 3 Encounters:   08/08/22 135.8 kg (299 lb 6.2 oz)   05/06/21 128.4 kg (283 lb 1.1 oz)   08/26/19 122.6 kg (270 lb 4.5 oz)     Temp Readings from Last 3 Encounters:   08/08/22 98.2 °F (36.8 °C) (Temporal)   05/06/21 97.6 °F (36.4 °C) (Tympanic)   08/26/19 98.5 °F (36.9 °C) (Oral)     BP Readings from Last 3 Encounters:   08/08/22 138/74   05/06/21 122/86   08/26/19 128/88     Pulse Readings from Last 3 Encounters:   08/08/22 98   08/26/19 84   12/10/18 99     Body mass index is 41.76 kg/m².      Review of Systems   Constitutional: Negative.  Negative for chills and fever.   HENT: Negative.  Negative for congestion, sinus pain and sore throat.    Eyes: Negative for blurred vision and double vision.   Respiratory: Negative for cough, sputum production, shortness of breath and wheezing.    Cardiovascular: Negative for chest pain, palpitations and leg swelling.   Gastrointestinal: Negative for abdominal pain, constipation, diarrhea, heartburn, nausea and vomiting.    Genitourinary: Negative.    Musculoskeletal: Negative.    Skin: Negative.  Negative for rash.   Neurological: Negative.    Endo/Heme/Allergies: Negative.  Negative for polydipsia. Does not bruise/bleed easily.   Psychiatric/Behavioral: Negative for depression and substance abuse.     Physical Exam  Vitals and nursing note reviewed.   Constitutional:       General: He is not in acute distress.     Appearance: He is well-developed. He is obese. He is not diaphoretic.   HENT:      Head: Normocephalic and atraumatic.      Right Ear: Tympanic membrane and external ear normal.      Left Ear: Tympanic membrane and external ear normal.      Nose: Nose normal.   Eyes:      General:         Right eye: No discharge.         Left eye: No discharge.      Conjunctiva/sclera: Conjunctivae normal.      Pupils: Pupils are equal, round, and reactive to light.   Neck:      Thyroid: No thyromegaly.   Cardiovascular:      Rate and Rhythm: Normal rate and regular rhythm.      Heart sounds: Normal heart sounds. No murmur heard.  Pulmonary:      Effort: Pulmonary effort is normal. No respiratory distress.      Breath sounds: Normal breath sounds. No wheezing.   Abdominal:      General: Bowel sounds are normal.   Skin:     General: Skin is warm and dry.   Neurological:      Mental Status: He is alert and oriented to person, place, and time.      Cranial Nerves: No cranial nerve deficit.   Psychiatric:         Behavior: Behavior normal.         Thought Content: Thought content normal.         Judgment: Judgment normal.         Laboratory Reviewed ({Yes)  Lab Results   Component Value Date    WBC 6.75 05/06/2021    HGB 14.7 05/06/2021    HCT 43.9 05/06/2021     05/06/2021    CHOL 172 07/11/2012    TRIG 89 07/11/2012    HDL 40 07/11/2012    ALT 34 05/06/2021    AST 47 (H) 05/06/2021     05/06/2021    K 3.9 05/06/2021     05/06/2021    CREATININE 1.2 05/06/2021    BUN 9 05/06/2021    CO2 27 05/06/2021    PSA 0.41 07/11/2012         was seen today for hypertension.    Diagnoses and all orders for this visit:    Annual physical exam  -     CBC Auto Differential; Future  -     COMPREHENSIVE METABOLIC PANEL; Future  -     Lipid Panel; Future    Benign essential hypertension  -     CBC Auto Differential; Future  -     COMPREHENSIVE METABOLIC PANEL; Future  -     amLODIPine (NORVASC) 5 MG tablet; TAKE 1 TABLET BY MOUTH ONCE DAILY NEED  APPOINTMENT  FOR  MORE  REFILLS  -     chlorthalidone (HYGROTEN) 25 MG Tab; TAKE 1 TABLET BY MOUTH ONCE DAILY NEED  APPOINTMENT  FOR  REFILLS    Morbid obesity with BMI of 40.0-44.9, adult  -     CBC Auto Differential; Future  -     COMPREHENSIVE METABOLIC PANEL; Future  -     Lipid Panel; Future    Dyslipidemia (high LDL; low HDL)  -     Lipid Panel; Future      Will get fasting labs  Refill BP meds  Informed if he does not get labs done, will not refill BP meds again    Appt with PCP in 6 months    Discussed increasing water/lean protein/fiber in diet  Exercising  Limiting salt and DASH diet during visit           Care Plan/Goals: Reviewed    Goals      Blood Pressure < 130/80      Exercise at least 150 minutes per week.      Take at least one BP reading per week at various times of the day           Follow up: No follow-ups on file.    After visit summary was printed and given to patient upon discharge today.  Patient goals and care plan are included in After Visit Summary.

## 2022-08-12 ENCOUNTER — LAB VISIT (OUTPATIENT)
Dept: LAB | Facility: HOSPITAL | Age: 37
End: 2022-08-12
Payer: COMMERCIAL

## 2022-08-12 DIAGNOSIS — Z00.00 ANNUAL PHYSICAL EXAM: ICD-10-CM

## 2022-08-12 DIAGNOSIS — E78.5 DYSLIPIDEMIA (HIGH LDL; LOW HDL): ICD-10-CM

## 2022-08-12 DIAGNOSIS — I10 BENIGN ESSENTIAL HYPERTENSION: ICD-10-CM

## 2022-08-12 DIAGNOSIS — E66.01 MORBID OBESITY WITH BMI OF 40.0-44.9, ADULT: ICD-10-CM

## 2022-08-12 PROCEDURE — 80061 LIPID PANEL: CPT

## 2022-08-12 PROCEDURE — 36415 COLL VENOUS BLD VENIPUNCTURE: CPT

## 2022-08-12 PROCEDURE — 80053 COMPREHEN METABOLIC PANEL: CPT

## 2022-08-13 LAB
ALBUMIN SERPL BCP-MCNC: 4.4 G/DL (ref 3.5–5.2)
ALP SERPL-CCNC: 62 U/L (ref 55–135)
ALT SERPL W/O P-5'-P-CCNC: 42 U/L (ref 10–44)
ANION GAP SERPL CALC-SCNC: 9 MMOL/L (ref 8–16)
AST SERPL-CCNC: 50 U/L (ref 10–40)
BILIRUB SERPL-MCNC: 0.7 MG/DL (ref 0.1–1)
BUN SERPL-MCNC: 18 MG/DL (ref 6–20)
CALCIUM SERPL-MCNC: 10.1 MG/DL (ref 8.7–10.5)
CHLORIDE SERPL-SCNC: 99 MMOL/L (ref 95–110)
CHOLEST SERPL-MCNC: 269 MG/DL (ref 120–199)
CHOLEST/HDLC SERPL: 6.3 {RATIO} (ref 2–5)
CO2 SERPL-SCNC: 27 MMOL/L (ref 23–29)
CREAT SERPL-MCNC: 1.4 MG/DL (ref 0.5–1.4)
EST. GFR  (NO RACE VARIABLE): >60 ML/MIN/1.73 M^2
GLUCOSE SERPL-MCNC: 94 MG/DL (ref 70–110)
HDLC SERPL-MCNC: 43 MG/DL (ref 40–75)
HDLC SERPL: 16 % (ref 20–50)
LDLC SERPL CALC-MCNC: 206 MG/DL (ref 63–159)
NONHDLC SERPL-MCNC: 226 MG/DL
POTASSIUM SERPL-SCNC: 4.1 MMOL/L (ref 3.5–5.1)
PROT SERPL-MCNC: 8.2 G/DL (ref 6–8.4)
SODIUM SERPL-SCNC: 135 MMOL/L (ref 136–145)
TRIGL SERPL-MCNC: 100 MG/DL (ref 30–150)

## 2022-08-15 DIAGNOSIS — R79.89 ELEVATED LIVER FUNCTION TESTS: Primary | ICD-10-CM

## 2022-08-15 DIAGNOSIS — E66.01 MORBID OBESITY WITH BMI OF 40.0-44.9, ADULT: ICD-10-CM

## 2022-08-15 DIAGNOSIS — I10 BENIGN ESSENTIAL HYPERTENSION: ICD-10-CM

## 2022-08-16 ENCOUNTER — TELEPHONE (OUTPATIENT)
Dept: ADMINISTRATIVE | Facility: HOSPITAL | Age: 37
End: 2022-08-16
Payer: COMMERCIAL

## 2022-10-27 ENCOUNTER — TELEPHONE (OUTPATIENT)
Dept: INTERNAL MEDICINE | Facility: CLINIC | Age: 37
End: 2022-10-27
Payer: COMMERCIAL

## 2022-10-27 NOTE — TELEPHONE ENCOUNTER
----- Message from Duong Liu sent at 10/27/2022  3:15 PM CDT -----  Contact: [phone]  Type:  Patient Returning Call    Who Called:   Who Left Message for Patient:GINNY  Does the patient know what this is regarding?:appt   Would the patient rather a call back or a response via [facility]? Call back   Best Call Back Number:[phone]   Additional Information:

## 2022-10-27 NOTE — TELEPHONE ENCOUNTER
Spoke with patient and scheduled him a pre-op appointment on 11/4/22 with Joselyn Carreon NP. He verbalized understanding.

## 2022-11-09 ENCOUNTER — HOSPITAL ENCOUNTER (OUTPATIENT)
Dept: CARDIOLOGY | Facility: HOSPITAL | Age: 37
Discharge: HOME | End: 2022-11-09
Attending: PEDIATRICS
Payer: COMMERCIAL

## 2022-11-09 ENCOUNTER — OFFICE VISIT (OUTPATIENT)
Dept: INTERNAL MEDICINE | Facility: CLINIC | Age: 37
End: 2022-11-09
Payer: COMMERCIAL

## 2022-11-09 VITALS
SYSTOLIC BLOOD PRESSURE: 112 MMHG | DIASTOLIC BLOOD PRESSURE: 80 MMHG | OXYGEN SATURATION: 96 % | TEMPERATURE: 98 F | BODY MASS INDEX: 41.97 KG/M2 | WEIGHT: 300.94 LBS | HEART RATE: 108 BPM

## 2022-11-09 DIAGNOSIS — I10 BENIGN ESSENTIAL HYPERTENSION: Chronic | ICD-10-CM

## 2022-11-09 DIAGNOSIS — E66.01 SEVERE OBESITY (BMI 35.0-39.9) WITH COMORBIDITY: Chronic | ICD-10-CM

## 2022-11-09 DIAGNOSIS — Z01.818 PREOPERATIVE CLEARANCE: Primary | ICD-10-CM

## 2022-11-09 DIAGNOSIS — E78.5 DYSLIPIDEMIA (HIGH LDL; LOW HDL): Chronic | ICD-10-CM

## 2022-11-09 DIAGNOSIS — M25.561 CHRONIC PAIN OF RIGHT KNEE: Chronic | ICD-10-CM

## 2022-11-09 DIAGNOSIS — G89.29 CHRONIC PAIN OF RIGHT KNEE: Chronic | ICD-10-CM

## 2022-11-09 DIAGNOSIS — Z01.818 PREOPERATIVE CLEARANCE: ICD-10-CM

## 2022-11-09 DIAGNOSIS — R74.01 ELEVATED TRANSAMINASE LEVEL: Chronic | ICD-10-CM

## 2022-11-09 PROCEDURE — 3074F PR MOST RECENT SYSTOLIC BLOOD PRESSURE < 130 MM HG: ICD-10-PCS | Mod: CPTII,S$GLB,, | Performed by: PEDIATRICS

## 2022-11-09 PROCEDURE — 3008F PR BODY MASS INDEX (BMI) DOCUMENTED: ICD-10-PCS | Mod: CPTII,S$GLB,, | Performed by: PEDIATRICS

## 2022-11-09 PROCEDURE — 3074F SYST BP LT 130 MM HG: CPT | Mod: CPTII,S$GLB,, | Performed by: PEDIATRICS

## 2022-11-09 PROCEDURE — 3079F PR MOST RECENT DIASTOLIC BLOOD PRESSURE 80-89 MM HG: ICD-10-PCS | Mod: CPTII,S$GLB,, | Performed by: PEDIATRICS

## 2022-11-09 PROCEDURE — 1160F PR REVIEW ALL MEDS BY PRESCRIBER/CLIN PHARMACIST DOCUMENTED: ICD-10-PCS | Mod: CPTII,S$GLB,, | Performed by: PEDIATRICS

## 2022-11-09 PROCEDURE — 99214 OFFICE O/P EST MOD 30 MIN: CPT | Mod: S$GLB,,, | Performed by: PEDIATRICS

## 2022-11-09 PROCEDURE — 1159F MED LIST DOCD IN RCRD: CPT | Mod: CPTII,S$GLB,, | Performed by: PEDIATRICS

## 2022-11-09 PROCEDURE — 1160F RVW MEDS BY RX/DR IN RCRD: CPT | Mod: CPTII,S$GLB,, | Performed by: PEDIATRICS

## 2022-11-09 PROCEDURE — 99999 PR PBB SHADOW E&M-EST. PATIENT-LVL IV: ICD-10-PCS | Mod: PBBFAC,,, | Performed by: PEDIATRICS

## 2022-11-09 PROCEDURE — 1159F PR MEDICATION LIST DOCUMENTED IN MEDICAL RECORD: ICD-10-PCS | Mod: CPTII,S$GLB,, | Performed by: PEDIATRICS

## 2022-11-09 PROCEDURE — 99214 PR OFFICE/OUTPT VISIT, EST, LEVL IV, 30-39 MIN: ICD-10-PCS | Mod: S$GLB,,, | Performed by: PEDIATRICS

## 2022-11-09 PROCEDURE — 93005 ELECTROCARDIOGRAM TRACING: CPT

## 2022-11-09 PROCEDURE — 93010 ELECTROCARDIOGRAM REPORT: CPT | Mod: ,,, | Performed by: INTERNAL MEDICINE

## 2022-11-09 PROCEDURE — 99999 PR PBB SHADOW E&M-EST. PATIENT-LVL IV: CPT | Mod: PBBFAC,,, | Performed by: PEDIATRICS

## 2022-11-09 PROCEDURE — 93010 EKG 12-LEAD: ICD-10-PCS | Mod: ,,, | Performed by: INTERNAL MEDICINE

## 2022-11-09 PROCEDURE — 3008F BODY MASS INDEX DOCD: CPT | Mod: CPTII,S$GLB,, | Performed by: PEDIATRICS

## 2022-11-09 PROCEDURE — 3079F DIAST BP 80-89 MM HG: CPT | Mod: CPTII,S$GLB,, | Performed by: PEDIATRICS

## 2022-11-09 NOTE — PROGRESS NOTES
Subjective:       Patient ID: Marquis Zazueta is a 37 y.o. male.    Chief Complaint: Pre-op Exam    Marquis Zazueta is a 37 y.o. male who presents to clinic for preop clearance. Will be having R knee arthroscope by Dr. Armas. Has had R knee surgery previously. No complications in past. His BP has been under control. Recently written for Phentermine but has not started.     PMHx, PSHx, SocHx, and FHx reviewed and discussed with patient.    Patient Active Problem List:     Severe obesity (BMI 35.0-39.9) with comorbidity     Benign essential hypertension     Dyslipidemia (high LDL; low HDL)     Chronic pain of right knee    Past Surgical History:  No date: PATELLAR TENDON REPAIR; Right  No date: ROTATOR CUFF REPAIR    Review of patient's family history indicates:    Social History    Socioeconomic History      Marital status: Single    Tobacco Use      Smoking status: Never      Smokeless tobacco: Never    Substance and Sexual Activity      Alcohol use: No        Comment: social      Drug use: No      Sexual activity: Yes        Partners: Female       Review of Systems   Constitutional:  Negative for activity change, appetite change, chills, diaphoresis, fatigue, fever and unexpected weight change.   HENT:  Negative for nasal congestion, ear pain, mouth sores, nosebleeds, postnasal drip, rhinorrhea, sneezing and sore throat.    Eyes:  Negative for photophobia, pain, discharge, redness and visual disturbance.   Respiratory:  Negative for cough, chest tightness, shortness of breath, wheezing and stridor.    Cardiovascular:  Negative for chest pain, palpitations and leg swelling.   Gastrointestinal:  Negative for abdominal distention, blood in stool, constipation, diarrhea, nausea and vomiting.   Genitourinary:  Negative for decreased urine volume, difficulty urinating, dysuria, flank pain, frequency, genital sores, hematuria and urgency.   Musculoskeletal:  Negative for arthralgias, back pain, joint swelling, neck pain and  neck stiffness.   Integumentary:  Negative for color change, pallor, rash and wound.   Neurological:  Negative for dizziness, syncope, speech difficulty, weakness, light-headedness and headaches.   Hematological:  Negative for adenopathy. Does not bruise/bleed easily.   Psychiatric/Behavioral:  Negative for confusion, decreased concentration, dysphoric mood, hallucinations, sleep disturbance and suicidal ideas. The patient is not nervous/anxious.    All other systems reviewed and are negative.      Objective:      Physical Exam  Vitals and nursing note reviewed.   Constitutional:       General: He is not in acute distress.     Appearance: He is well-developed.   Neck:      Thyroid: No thyromegaly.      Vascular: No JVD.   Cardiovascular:      Rate and Rhythm: Normal rate and regular rhythm.      Heart sounds: Normal heart sounds. No murmur heard.  Pulmonary:      Effort: Pulmonary effort is normal. No respiratory distress.      Breath sounds: Normal breath sounds. No wheezing or rales.   Abdominal:      General: There is no distension.      Palpations: Abdomen is soft. There is no mass.      Tenderness: There is no abdominal tenderness. There is no guarding.   Musculoskeletal:      Right lower leg: No edema.      Left lower leg: No edema.   Lymphadenopathy:      Cervical: No cervical adenopathy.   Skin:     Capillary Refill: Capillary refill takes less than 2 seconds.      Findings: No rash.   Neurological:      General: No focal deficit present.      Mental Status: He is alert and oriented to person, place, and time.      Cranial Nerves: No cranial nerve deficit.      Coordination: Coordination normal.   Psychiatric:         Mood and Affect: Mood normal.         Behavior: Behavior normal.         Thought Content: Thought content normal.         Judgment: Judgment normal.       Assessment:       Problem List Items Addressed This Visit       Benign essential hypertension (Chronic)    Chronic pain of right knee  (Chronic)    Relevant Orders    Basic Metabolic Panel    CBC Auto Differential    EKG 12-lead    Dyslipidemia (high LDL; low HDL) (Chronic)    RESOLVED: Elevated transaminase level (Chronic)    Severe obesity (BMI 35.0-39.9) with comorbidity (Chronic)     Other Visit Diagnoses       Preoperative clearance    -  Primary    Relevant Orders    Basic Metabolic Panel    CBC Auto Differential    EKG 12-lead            Plan:     Preoperative clearance  -     Basic Metabolic Panel; Future; Expected date: 11/09/2022  -     CBC Auto Differential; Future; Expected date: 11/09/2022  -     EKG 12-lead; Future    Chronic pain of right knee  -     Basic Metabolic Panel; Future; Expected date: 11/09/2022  -     CBC Auto Differential; Future; Expected date: 11/09/2022  -     EKG 12-lead; Future    Benign essential hypertension    Dyslipidemia (high LDL; low HDL)    Severe obesity (BMI 35.0-39.9) with comorbidity    Elevated transaminase level      Await EKG, CBC, and Chem7. BP is under control. If labs normal, he is at low risk for proposed surgery. Hold Phentermine and NSAIDS 7 days prior. Note to Dr. Pawel Keating Attestation:   I, Tuan Mckeon, am scribing for, and in the presence of, Dr. Sergey Russell Jr. I performed the above scribed service and the documentation accurately describes the services I performed. I attest to the accuracy of the note.    I, Dr. Sergey Russell Jr, reviewed documentation as scribed above. I personally performed the services described in this documentation.  I agree that the record reflects my personal performance and is accurate and complete. Sergey Russell Jr., MD.  11/09/2022

## 2023-04-21 DIAGNOSIS — I10 BENIGN ESSENTIAL HYPERTENSION: ICD-10-CM

## 2023-04-21 NOTE — TELEPHONE ENCOUNTER
No new care gaps identified.  [facility] Embedded Care Gaps. Reference number: [id number]. 4/21/2023   6:53:38 PM CDT

## 2023-04-24 NOTE — TELEPHONE ENCOUNTER
Refill Routing Note   Medication(s) are not appropriate for processing by [facility] for the following reason(s):      Patient not seen by PCP within 15 months    [facility] action(s):  Defer       Medication Therapy Plan: LOV with PCP 8/26/19      Appointments  past 12m or future 3m with PCP    Date Provider   Last Visit   8/26/2019 PRAKASH Barrera MD   Next Visit   Visit date not found PRAKASH Barrera MD   ED visits in past 90 days: 0        Note composed:1:51 PM 04/24/2023

## 2023-04-25 RX ORDER — AMLODIPINE BESYLATE 5 MG/1
TABLET ORAL
Qty: 7 TABLET | Refills: 1 | Status: SHIPPED | OUTPATIENT
Start: 2023-04-25

## 2023-04-25 RX ORDER — CHLORTHALIDONE 25 MG/1
TABLET ORAL
Qty: 7 TABLET | Refills: 1 | Status: SHIPPED | OUTPATIENT
Start: 2023-04-25

## 2023-04-25 NOTE — TELEPHONE ENCOUNTER
I haven't seen him in almost 4 years.  He no-showed his last 2 appointment with me.    14-day refill approved. Appointment required for more refills.    TO MY TEAM:   Schedule soonest available regular in-office appointment with me.  It that appointment is not within 14 days, schedule him Nurse Visit blood pressure check within 14 days, and he can re-submit refill request then if additional refills needed to last until next appointment with me.

## 2024-04-10 DIAGNOSIS — I10 BENIGN ESSENTIAL HYPERTENSION: ICD-10-CM
